# Patient Record
Sex: FEMALE | Race: ASIAN | Employment: FULL TIME | URBAN - METROPOLITAN AREA
[De-identification: names, ages, dates, MRNs, and addresses within clinical notes are randomized per-mention and may not be internally consistent; named-entity substitution may affect disease eponyms.]

---

## 2017-09-18 ENCOUNTER — TELEPHONE (OUTPATIENT)
Dept: FAMILY MEDICINE CLINIC | Age: 57
End: 2017-09-18

## 2017-09-18 NOTE — TELEPHONE ENCOUNTER
----- Message from Charlie Orta sent at 9/15/2017  4:31 PM EDT -----  Regarding: Dr. Hussain Castrejon  The patient is requesting that a copy of the facility that she had her last mammogram done or a copy of her mammogram results is prepared for  from the office preferably on Monday 9/18/17.  [phone]

## 2019-04-16 ENCOUNTER — OFFICE VISIT (OUTPATIENT)
Dept: FAMILY MEDICINE CLINIC | Age: 59
End: 2019-04-16

## 2019-04-16 VITALS
SYSTOLIC BLOOD PRESSURE: 132 MMHG | HEIGHT: 65 IN | DIASTOLIC BLOOD PRESSURE: 80 MMHG | HEART RATE: 90 BPM | TEMPERATURE: 98.2 F | RESPIRATION RATE: 18 BRPM | OXYGEN SATURATION: 98 % | WEIGHT: 157.2 LBS | BODY MASS INDEX: 26.19 KG/M2

## 2019-04-16 DIAGNOSIS — R74.8 ELEVATED LIVER ENZYMES: Primary | ICD-10-CM

## 2019-04-16 DIAGNOSIS — E55.9 VITAMIN D DEFICIENCY: ICD-10-CM

## 2019-04-16 DIAGNOSIS — Z12.11 SCREENING FOR COLON CANCER: ICD-10-CM

## 2019-04-16 DIAGNOSIS — Z85.43 HISTORY OF OVARIAN CANCER: ICD-10-CM

## 2019-04-16 DIAGNOSIS — R73.03 PRE-DIABETES: ICD-10-CM

## 2019-04-16 DIAGNOSIS — E78.5 HYPERLIPIDEMIA, UNSPECIFIED HYPERLIPIDEMIA TYPE: ICD-10-CM

## 2019-04-16 RX ORDER — CHOLECALCIFEROL (VITAMIN D3) 125 MCG
5000 CAPSULE ORAL DAILY
COMMUNITY

## 2019-04-16 RX ORDER — ASPIRIN 81 MG/1
81 TABLET ORAL DAILY
COMMUNITY

## 2019-04-16 RX ORDER — VITAMIN E 268 MG
400 CAPSULE ORAL DAILY
COMMUNITY

## 2019-04-16 RX ORDER — METFORMIN HYDROCHLORIDE 500 MG/1
500 TABLET ORAL DAILY
COMMUNITY
End: 2019-05-08 | Stop reason: SDUPTHER

## 2019-04-16 NOTE — PATIENT INSTRUCTIONS
Colon Cancer Screening: Care Instructions Your Care Instructions Colorectal cancer occurs in the colon or rectum. That's the lower part of your digestive system. It is the second-leading cause of cancer deaths in the [place]. It often starts with small growths called polyps in the colon or rectum. Polyps are usually found with screening tests. Depending on the type of test, any polyps found may be removed during the tests. Colorectal cancer usually does not cause symptoms at first. But regular tests can help find it early, before it spreads and becomes harder to treat. Experts advise routine tests for colon cancer for people starting at age 48. And they advise people with a higher risk of colon cancer to get tested sooner. Talk with your doctor about when you should start testing. Discuss which tests you need. Follow-up care is a key part of your treatment and safety. Be sure to make and go to all appointments, and call your doctor if you are having problems. It's also a good idea to know your test results and keep a list of the medicines you take. What are the main screening tests for colon cancer? · Stool tests. These include the fecal immunochemical test (FIT) and the fecal occult blood test (FOBT). These tests check stool samples for signs of cancer. If your test is positive, you will need to have a colonoscopy. · Sigmoidoscopy. This test lets your doctor look at the lining of your rectum and the lowest part of your colon. Your doctor uses a lighted tube called a sigmoidoscope. This test can't find cancers or polyps in the upper part of your colon. In some cases, polyps that are found can be removed. But if your doctor finds polyps, you will need to have a colonoscopy to check the upper part of your colon. · Colonoscopy. This test lets your doctor look at the lining of your rectum and your entire colon.  The doctor uses a thin, flexible tool called a colonoscope. It can also be used to remove polyps or get a tissue sample (biopsy). What tests do you need? The following guidelines are for people age 48 and over who are not at high risk for colorectal cancer. You may have at least one of these tests as directed by your doctor. · Fecal immunochemical test (FIT) or fecal occult blood test (FOBT) every year · Sigmoidoscopy every 5 years · Colonoscopy every 10 years If you are age 68 to 80, you can work with your doctor to decide if screening is a good option. If you are age 80 or older, your doctor will likely advise that screening is not helpful. Talk with your doctor about when you need to be tested. And discuss which tests are right for you. Your doctor may recommend earlier or more frequent testing if you: 
· Have had colorectal cancer before. · Have had colon polyps. · Have symptoms of colorectal cancer. These include blood in your stool and changes in your bowel habits. · Have a parent, brother or sister, or child with colon polyps or colorectal cancer. · Have a bowel disease. This includes ulcerative colitis and Crohn's disease. · Have a rare polyp syndrome that runs in families, such as familial adenomatous polyposis (FAP). · Have had radiation treatments to the belly or pelvis. When should you call for help? Watch closely for changes in your health, and be sure to contact your doctor if: 
  · You have any changes in your bowel habits.  
  · You have any problems. Where can you learn more? Go to http://slim-nato.info/. Enter M541 in the search box to learn more about \"Colon Cancer Screening: Care Instructions. \" Current as of: March 27, 2018 Content Version: 11.9 © 9782-0823 [employer]. Care instructions adapted under license by [employer] (which disclaims liability or warranty for this information).  If you have questions about a medical condition or this instruction, always ask your healthcare professional. [place] [address] any warranty or liability for your use of this information.

## 2019-04-16 NOTE — PROGRESS NOTES
Chief Complaint Patient presents with  New Patient  Labs  
  follow up on labs abnormal liver enzimes 1. Have you been to the ER, urgent care clinic since your last visit? Hospitalized since your last visit? No 
 
2. Have you seen or consulted any other health care providers outside of the [address] [place] [address] since your last visit? Include any pap smears or colon screening.  No

## 2019-04-16 NOTE — PROGRESS NOTES
Patient Name: Yanelis Eduardo MRN: [id number] SUBJECTIVE Yanelis Eduardo is a 63 y.o. female who presents with the following: Here to establish care with new PCP. Patient states that she is being followed by hepatology for elevated liver enzymes, thought to be due to fatty liver. She recently was put on atorvastatin 20 mg for hyperlipidemia and metformin 500 mg daily for prediabetes. She has a history of vitamin D deficiency for months. Overdue for colon cancer screening. Thought that she had some swelling in the front of her neck sometime last year and an ultrasound which was reportedly normal.  Has had some difficulty losing weight but admits that she has not been exercising regularly. Had mammogram done at [facility]. Sister has breast cancer. Review of Systems Constitutional: Negative for fever, malaise/fatigue and weight loss. Respiratory: Negative for cough, hemoptysis, shortness of breath and wheezing. Cardiovascular: Negative for chest pain, palpitations, leg swelling and PND. Gastrointestinal: Negative for abdominal pain, constipation, diarrhea, nausea and vomiting. The patient's medications, allergies, past medical history, surgical history, family history and social history were reviewed and updated where appropriate. Prior to Admission medications Medication Sig Start Date End Date Taking? Authorizing Provider  
aspirin delayed-release 81 mg tablet Take 81 mg by mouth daily. Yes Provider, Historical  
metFORMIN (GLUCOPHAGE) 500 mg tablet Take 500 mg by mouth daily. Yes Provider, Historical  
cholecalciferol, vitamin D3, (VITAMIN D3) 2,000 unit tab Take 5,000 Units by mouth daily. Yes Provider, Historical  
vitamin E (AQUA GEMS) 400 unit capsule Take 400 Units by mouth daily. Yes Provider, Historical  
MILK THISTLE PO Take 525 mg by mouth daily. Yes Provider, Historical  
atorvastatin (LIPITOR) 10 mg tablet Take 1 tablet by mouth nightly. Patient taking differently: Take 20 mg by mouth nightly. 1/11/15  Yes Avel Madden MD  
co-enzyme Q-10 (CO Q-10) 100 mg capsule Take 100 mg by mouth two (2) times a day. Yes Provider, Historical  
ALPRAZolam (XANAX) 0.5 mg tablet Take 1 Tab by mouth three (3) times daily as needed for Anxiety (related to flying). 14  Yes Avel Madden MD  
multivitamin (ONE A DAY) tablet Take 1 Tab by mouth daily. Yes Provider, Historical  
 
 
Allergies Allergen Reactions  Crestor [Rosuvastatin] Other (comments) Aching  Zocor [Simvastatin] Other (comments) Felt tired and achy Past Medical History:  
Diagnosis Date  Anemia NEC  Anxiety about flying 2012  Beta thalassemia trait 3/11/2011  Elevated liver enzymes 3/11/2011  
 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  
Procedure Laterality Date [address] [address]  
 x 2  
 HX OOPHORECTOMY  [id number] left ovary for cysts  HX TOTAL ABDOMINAL HYSTERECTOMY    
 with right oopherectomy for ovarian mass Family History Problem Relation Age of Onset Lang.Moras Arthritis-osteo Mother  High Cholesterol Mother  Heart Failure Father  High Cholesterol Father  Heart Attack Father 64  
      MI age 64  High Cholesterol Sister  Heart Disease Sister   
     cabg at age 46  
 Heart Failure Paternal Grandfather Social History Socioeconomic History  Marital status: SINGLE  
 Spouse name: Not on file  Number of children: 2  
 Years of education: Not on file  Highest education level: Not on file Occupational History  Occupation: Part-time  at [employer] Social Needs  Financial resource strain: Not on file  Food insecurity:  
  Worry: Not on file Inability: Not on file  Transportation needs:  
  Medical: Not on file Non-medical: Not on file Tobacco Use  Smoking status: Never Smoker  Smokeless tobacco: Never Used Substance and Sexual Activity  Alcohol use: No  
 Drug use: No  
 Sexual activity: Not Currently Partners: Male Lifestyle  Physical activity:  
  Days per week: Not on file Minutes per session: Not on file  Stress: Not on file Relationships  Social connections:  
  Talks on phone: Not on file Gets together: Not on file Attends Rastafarian service: Not on file Active member of club or organization: Not on file Attends meetings of clubs or organizations: Not on file Relationship status: Not on file  Intimate partner violence:  
  Fear of current or ex partner: Not on file Emotionally abused: Not on file Physically abused: Not on file Forced sexual activity: Not on file Other Topics Concern [address] Service Not Asked  Blood Transfusions Not Asked  Caffeine Concern No  
  Comment: 1 cup of coffee a day; no sodas  Occupational Exposure Not Asked [place] Not Asked  Sleep Concern Not Asked  Stress Concern Not Asked  Weight Concern No  
  Comment: stable over the years, usually in the 140-150 range  Special Diet Yes Comment: tries to follow sensible diet, avoids sweets and sugars, paying attention to carbs more and limiting them  Back Care Yes Comment: chiropractor  Exercise No  
  Comment: plans to restart soon, not lately but had been doing 3 x a week: walks 3.5 miles  Bike Helmet Not Asked  Seat Belt Not Asked  Self-Exams Not Asked Social History Narrative , 2 sons; [place] descent; Born in [place] OBJECTIVE Visit Vitals /80 (BP 1 Location: Right arm, BP Patient Position: Sitting) Pulse 90 Temp 98.2 °F (36.8 °C) (Oral) Resp 18 Ht 5' 5\" (1.651 m) Wt 157 lb 3.2 oz (71.3 kg) SpO2 98% BMI 26.16 kg/m² Physical Exam  
Constitutional: She is oriented to person, place, and time and well-developed, well-nourished, and in no distress. No distress. HENT:  
Head: Normocephalic. Right Ear: External ear normal.  
Left Ear: External ear normal.  
Eyes: Pupils are equal, round, and reactive to light. Conjunctivae and EOM are normal.  
Neck: Normal range of motion. Neck supple. No thyromegaly present. Cardiovascular: Normal rate, regular rhythm, normal heart sounds and intact distal pulses. Exam reveals no gallop and no friction rub. No murmur heard. Pulmonary/Chest: Effort normal and breath sounds normal. No respiratory distress. She has no wheezes. She has no rales. Abdominal: Soft. Bowel sounds are normal. She exhibits no distension. There is no tenderness. There is no rebound and no guarding. Neurological: She is alert and oriented to person, place, and time. Skin: Skin is warm and dry. She is not diaphoretic. Psychiatric: Memory, affect and judgment normal.  
 
 
ASSESSMENT AND PLAN Chalo Light is a 63 y.o. female who presents today for: 1. Elevated liver enzymes Stable, continue current treatment pending review of labs. - CBC WITH AUTOMATED DIFF 
- FERRITIN 
- IRON PROFILE 
- HEPATITIS PANEL, ACUTE 2. Pre-diabetes Stable, continue current treatment pending review of labs. - HEMOGLOBIN A1C WITH EAG 
- MICROALBUMIN, UR, RAND W/ MICROALB/CREAT RATIO 3. Hyperlipidemia, unspecified hyperlipidemia type. Will calculate ASCVD risk score pending labs. - LIPID PANEL 4. Vitamin D deficiency 
- VITAMIN D, 25 HYDROXY 5. Screening for colon cancer 
- REFERRAL TO GASTROENTEROLOGY 6. BMI 26.0-26.9,adult I have reviewed/discussed the above normal BMI with the patient. I have recommended the following interventions: dietary management education, guidance, and counseling, encourage exercise, monitor weight and prescribed dietary intake.  
- TSH AND FREE T4 
 
7. History of ovarian cancer 
- CANCER ANTIGEN 125 Medications Discontinued During This Encounter Medication Reason  conjugated estrogens (PREMARIN) 0.3 mg tablet  ergocalciferol (ERGOCALCIFEROL) 50,000 unit capsule  OTHER Follow-up and Dispositions · Return in about 3 months (around 7/16/2019) for DM follow up. Medication risks/benefits/costs/interactions/alternatives discussed with patient. Advised patient to call back or return to office if symptoms worsen/change/persist. If patient cannot reach us or should anything more severe/urgent arise he/she should proceed directly to the nearest emergency department. Discussed expected course/resolution/complications of diagnosis in detail with patient. Patient given a written after visit summary which includes his/her diagnoses, current medications and vitals. Patient expressed understanding with the diagnosis and plan. Malik Hale M.D.

## 2019-05-08 ENCOUNTER — TELEPHONE (OUTPATIENT)
Dept: FAMILY MEDICINE CLINIC | Age: 59
End: 2019-05-08

## 2019-05-08 LAB
25(OH)D3+25(OH)D2 SERPL-MCNC: 40.5 NG/ML (ref 30–100)
ALBUMIN/CREAT UR: <2.3 MG/G CREAT (ref 0–30)
BASOPHILS # BLD AUTO: 0 X10E3/UL (ref 0–0.2)
BASOPHILS NFR BLD AUTO: 0 %
CANCER AG125 SERPL-ACNC: 6.7 U/ML (ref 0–38.1)
CHOLEST SERPL-MCNC: 186 MG/DL (ref 100–199)
CREAT UR-MCNC: 130.7 MG/DL
EOSINOPHIL # BLD AUTO: 0.2 X10E3/UL (ref 0–0.4)
EOSINOPHIL NFR BLD AUTO: 4 %
ERYTHROCYTE [DISTWIDTH] IN BLOOD BY AUTOMATED COUNT: 18.9 % (ref 12.3–15.4)
EST. AVERAGE GLUCOSE BLD GHB EST-MCNC: 137 MG/DL
FERRITIN SERPL-MCNC: 267 NG/ML (ref 15–150)
HAV IGM SERPL QL IA: NEGATIVE
HBA1C MFR BLD: 6.4 % (ref 4.8–5.6)
HBV CORE IGM SERPL QL IA: NEGATIVE
HBV SURFACE AG SERPL QL IA: NEGATIVE
HCT VFR BLD AUTO: 37.2 % (ref 34–46.6)
HCV AB S/CO SERPL IA: <0.1 S/CO RATIO (ref 0–0.9)
HDLC SERPL-MCNC: 49 MG/DL
HGB BLD-MCNC: 11.5 G/DL (ref 11.1–15.9)
IMM GRANULOCYTES # BLD AUTO: 0 X10E3/UL (ref 0–0.1)
IMM GRANULOCYTES NFR BLD AUTO: 0 %
INTERPRETATION, 910389: NORMAL
IRON SATN MFR SERPL: 28 % (ref 15–55)
IRON SERPL-MCNC: 70 UG/DL (ref 27–159)
LDLC SERPL CALC-MCNC: 108 MG/DL (ref 0–99)
LYMPHOCYTES # BLD AUTO: 1.9 X10E3/UL (ref 0.7–3.1)
LYMPHOCYTES NFR BLD AUTO: 35 %
MCH RBC QN AUTO: 19.2 PG (ref 26.6–33)
MCHC RBC AUTO-ENTMCNC: 30.9 G/DL (ref 31.5–35.7)
MCV RBC AUTO: 62 FL (ref 79–97)
MICROALBUMIN UR-MCNC: <3 UG/ML
MONOCYTES # BLD AUTO: 0.3 X10E3/UL (ref 0.1–0.9)
MONOCYTES NFR BLD AUTO: 5 %
NEUTROPHILS # BLD AUTO: 3 X10E3/UL (ref 1.4–7)
NEUTROPHILS NFR BLD AUTO: 56 %
PLATELET # BLD AUTO: 282 X10E3/UL (ref 150–379)
RBC # BLD AUTO: 5.99 X10E6/UL (ref 3.77–5.28)
T4 FREE SERPL-MCNC: 1.19 NG/DL (ref 0.82–1.77)
TIBC SERPL-MCNC: 252 UG/DL (ref 250–450)
TRIGL SERPL-MCNC: 146 MG/DL (ref 0–149)
TSH SERPL DL<=0.005 MIU/L-ACNC: 1.1 UIU/ML (ref 0.45–4.5)
UIBC SERPL-MCNC: 182 UG/DL (ref 131–425)
VLDLC SERPL CALC-MCNC: 29 MG/DL (ref 5–40)
WBC # BLD AUTO: 5.4 X10E3/UL (ref 3.4–10.8)

## 2019-05-08 NOTE — TELEPHONE ENCOUNTER
----- Message from [facility] sent at 5/8/2019  4:29 PM EDT -----  Regarding: Dr Asencio/ telephone  The pt returned the missed call and believes it was bout her blood work results.       Best contact number is [phone]

## 2019-05-08 NOTE — TELEPHONE ENCOUNTER
Patient request refill on metformin and cholesterol medication. Patient would also like to know if she should continue the low dose aspirin.

## 2019-05-08 NOTE — PROGRESS NOTES
Please notify patient regarding their test results: 
 
A1c is at goal; continue metformin. LDL cholesterol is a little high; continue statin. Vitamin D level normal.  
Ovarian cancer marker is normal. 
Red blood cell size is smaller with high iron levels - may be due to liver condition but will continue to monitor. Pt to schedule GI appt for colonoscopy.

## 2019-05-08 NOTE — PROGRESS NOTES
Outbound call to patient.  verified. Patient made aware of lab results and recommendations per Dr. Martin Victoria. Patient verbalized understanding.

## 2019-05-09 ENCOUNTER — PATIENT MESSAGE (OUTPATIENT)
Dept: FAMILY MEDICINE CLINIC | Age: 59
End: 2019-05-09

## 2019-05-09 DIAGNOSIS — R74.8 ELEVATED LIVER ENZYMES: Primary | ICD-10-CM

## 2019-05-09 RX ORDER — ATORVASTATIN CALCIUM 20 MG/1
20 TABLET, FILM COATED ORAL
Qty: 90 TAB | Refills: 1 | Status: SHIPPED | OUTPATIENT
Start: 2019-05-09 | End: 2019-12-05 | Stop reason: SDUPTHER

## 2019-05-09 RX ORDER — METFORMIN HYDROCHLORIDE 500 MG/1
500 TABLET ORAL DAILY
Qty: 90 TAB | Refills: 1 | Status: SHIPPED | OUTPATIENT
Start: 2019-05-09 | End: 2019-08-15 | Stop reason: SDUPTHER

## 2019-05-11 LAB
ALBUMIN SERPL-MCNC: 4.6 G/DL (ref 3.5–5.5)
ALBUMIN/GLOB SERPL: 2 {RATIO} (ref 1.2–2.2)
ALP SERPL-CCNC: 79 IU/L (ref 39–117)
ALT SERPL-CCNC: 40 IU/L (ref 0–32)
AST SERPL-CCNC: 32 IU/L (ref 0–40)
BILIRUB SERPL-MCNC: 0.5 MG/DL (ref 0–1.2)
BUN SERPL-MCNC: 10 MG/DL (ref 6–24)
BUN/CREAT SERPL: 15 (ref 9–23)
CALCIUM SERPL-MCNC: 10.2 MG/DL (ref 8.7–10.2)
CHLORIDE SERPL-SCNC: 103 MMOL/L (ref 96–106)
CO2 SERPL-SCNC: 26 MMOL/L (ref 20–29)
CREAT SERPL-MCNC: 0.65 MG/DL (ref 0.57–1)
GLOBULIN SER CALC-MCNC: 2.3 G/DL (ref 1.5–4.5)
GLUCOSE SERPL-MCNC: 149 MG/DL (ref 65–99)
POTASSIUM SERPL-SCNC: 4.4 MMOL/L (ref 3.5–5.2)
PROT SERPL-MCNC: 6.9 G/DL (ref 6–8.5)
SODIUM SERPL-SCNC: 143 MMOL/L (ref 134–144)

## 2019-05-13 NOTE — PROGRESS NOTES
Dear Ms. Mills,    I hope you are doing well. I wanted to follow up on your recent test results: Your ALT marker is slightly high and AST is normal. Overall, things remain stable. Please let me know if you have any questions.

## 2019-07-30 ENCOUNTER — OFFICE VISIT (OUTPATIENT)
Dept: FAMILY MEDICINE CLINIC | Age: 59
End: 2019-07-30

## 2019-07-30 VITALS
WEIGHT: 156.6 LBS | OXYGEN SATURATION: 97 % | RESPIRATION RATE: 16 BRPM | HEIGHT: 65 IN | TEMPERATURE: 98.3 F | BODY MASS INDEX: 26.09 KG/M2 | DIASTOLIC BLOOD PRESSURE: 72 MMHG | HEART RATE: 84 BPM | SYSTOLIC BLOOD PRESSURE: 128 MMHG

## 2019-07-30 DIAGNOSIS — E78.5 HYPERLIPIDEMIA, UNSPECIFIED HYPERLIPIDEMIA TYPE: ICD-10-CM

## 2019-07-30 DIAGNOSIS — R74.8 ELEVATED LIVER ENZYMES: ICD-10-CM

## 2019-07-30 DIAGNOSIS — R73.03 PRE-DIABETES: Primary | ICD-10-CM

## 2019-07-30 DIAGNOSIS — Z23 ENCOUNTER FOR IMMUNIZATION: ICD-10-CM

## 2019-07-30 DIAGNOSIS — D50.9 MICROCYTIC ANEMIA: ICD-10-CM

## 2019-07-30 NOTE — PATIENT INSTRUCTIONS

## 2019-07-30 NOTE — PROGRESS NOTES
Chief Complaint   Patient presents with    Diabetes     follow up     1. Have you been to the ER, urgent care clinic since your last visit? Hospitalized since your last visit? No    2. Have you seen or consulted any other health care providers outside of the [address] [place] [address] since your last visit? Include any pap smears or colon screening.  No

## 2019-07-30 NOTE — PROGRESS NOTES
Patient Name: Nicole Posadas   MRN: [id number]    Emir Cohen is a 63 y.o. female who presents with the following: The patient has hyperlipidemia and pre-dm. She reports taking medications as instructed, no medication side effects noted. Diet and Lifestyle: generally follows a low fat low cholesterol diet, generally follows a low sodium diet, no formal exercise but active during the day. Lab review: orders written for new lab studies as appropriate; see orders. Hx of microcytic anemia. Hx of elevated LFTs due to fatty liver. Review of Systems   Constitutional: Negative for fever, malaise/fatigue and weight loss. Respiratory: Negative for cough, hemoptysis, shortness of breath and wheezing. Cardiovascular: Negative for chest pain, palpitations, leg swelling and PND. Gastrointestinal: Negative for abdominal pain, constipation, diarrhea, nausea and vomiting. The patient's medications, allergies, past medical history, surgical history, family history and social history were reviewed and updated where appropriate. Prior to Admission medications    Medication Sig Start Date End Date Taking? Authorizing Provider   atorvastatin (LIPITOR) 20 mg tablet Take 1 Tab by mouth nightly. 5/9/19  Yes Mulugeta Phillips MD   metFORMIN (GLUCOPHAGE) 500 mg tablet Take 1 Tab by mouth daily. 5/9/19  Yes Mulugeta Phillips MD   cholecalciferol, vitamin D3, (VITAMIN D3) 2,000 unit tab Take 5,000 Units by mouth daily. Yes Provider, Historical   vitamin E (AQUA GEMS) 400 unit capsule Take 400 Units by mouth daily. Yes Provider, Historical   MILK THISTLE PO Take 525 mg by mouth daily. Yes Provider, Historical   multivitamin (ONE A DAY) tablet Take 1 Tab by mouth daily. Yes Provider, Historical   aspirin delayed-release 81 mg tablet Take 81 mg by mouth daily. Provider, Historical   co-enzyme Q-10 (CO Q-10) 100 mg capsule Take 100 mg by mouth two (2) times a day. Provider, Historical   ALPRAZolam (XANAX) 0.5 mg tablet Take 1 Tab by mouth three (3) times daily as needed for Anxiety (related to flying). 2/11/14   Basil Madden MD       Allergies   Allergen Reactions    Crestor [Rosuvastatin] Other (comments)     Aching     Zocor [Simvastatin] Other (comments)     Felt tired and achy           OBJECTIVE    Visit Vitals  /72 (BP 1 Location: Left arm, BP Patient Position: Sitting)   Pulse 84   Temp 98.3 °F (36.8 °C) (Oral)   Resp 16   Ht 5' 5\" (1.651 m)   Wt 156 lb 9.6 oz (71 kg)   SpO2 97%   BMI 26.06 kg/m²       Physical Exam   Constitutional: She is oriented to person, place, and time and well-developed, well-nourished, and in no distress. No distress. Eyes: Pupils are equal, round, and reactive to light. Conjunctivae and EOM are normal.   Cardiovascular: Normal rate, regular rhythm and normal heart sounds. Exam reveals no gallop and no friction rub. No murmur heard. Pulmonary/Chest: Effort normal and breath sounds normal. No respiratory distress. She has no wheezes. Neurological: She is alert and oriented to person, place, and time. Skin: Skin is warm and dry. No rash noted. She is not diaphoretic. Psychiatric: Mood, memory, affect and judgment normal.   Nursing note and vitals reviewed. ASSESSMENT AND PLAN  Judy Mccain is a 63 y.o. female who presents today for:    1. Pre-diabetes  Stable, continue current treatment pending review of labs. - HEMOGLOBIN A1C WITH EAG  - BASIC METABOLIC PANEL (7)    2. Hyperlipidemia, unspecified hyperlipidemia type  Will calculate ASCVD risk score pending labs. - LIPID PANEL    3. Elevated liver enzymes  - HEPATIC FUNCTION PANEL    4. Microcytic anemia  - CBC WITH AUTOMATED DIFF  - FERRITIN  - IRON PROFILE  - HEMOGLOBIN FRACTIONATION    5. Encounter for immunization  She will get Td at pharmacy.   - varicella-zoster recombinant, PF, (SHINGRIX, PF,) 50 mcg/0.5 mL susr injection; 0.5 mL by IntraMUSCular route once for 1 dose. Please fax vaccination documentation to [facility] at [phone], Attn: Dr. Rojas Alter: 0.5 mL; Refill: 0       There are no discontinued medications. Follow-up and Dispositions    · Return in about 6 months (around 1/30/2020) for pre-dm. Medication risks/benefits/costs/interactions/alternatives discussed with patient. Advised patient to call back or return to office if symptoms worsen/change/persist. If patient cannot reach us or should anything more severe/urgent arise he/she should proceed directly to the nearest emergency department. Discussed expected course/resolution/complications of diagnosis in detail with patient. Patient given a written after visit summary which includes his/her diagnoses, current medications and vitals. Patient expressed understanding with the diagnosis and plan. Malik Baig M.D.

## 2019-08-01 LAB
ALBUMIN SERPL-MCNC: 4.8 G/DL (ref 3.5–5.5)
ALP SERPL-CCNC: 82 IU/L (ref 39–117)
ALT SERPL-CCNC: 71 IU/L (ref 0–32)
AST SERPL-CCNC: 51 IU/L (ref 0–40)
BASOPHILS # BLD AUTO: 0 X10E3/UL (ref 0–0.2)
BASOPHILS NFR BLD AUTO: 0 %
BILIRUB DIRECT SERPL-MCNC: 0.18 MG/DL (ref 0–0.4)
BILIRUB SERPL-MCNC: 0.7 MG/DL (ref 0–1.2)
BUN SERPL-MCNC: 10 MG/DL (ref 6–24)
BUN/CREAT SERPL: 14 (ref 9–23)
CHLORIDE SERPL-SCNC: 104 MMOL/L (ref 96–106)
CHOLEST SERPL-MCNC: 194 MG/DL (ref 100–199)
CO2 SERPL-SCNC: 23 MMOL/L (ref 20–29)
CREAT SERPL-MCNC: 0.69 MG/DL (ref 0.57–1)
EOSINOPHIL # BLD AUTO: 0.2 X10E3/UL (ref 0–0.4)
EOSINOPHIL NFR BLD AUTO: 4 %
ERYTHROCYTE [DISTWIDTH] IN BLOOD BY AUTOMATED COUNT: 18.3 % (ref 12.3–15.4)
EST. AVERAGE GLUCOSE BLD GHB EST-MCNC: 163 MG/DL
FERRITIN SERPL-MCNC: 367 NG/ML (ref 15–150)
GLUCOSE SERPL-MCNC: 145 MG/DL (ref 65–99)
HBA1C MFR BLD: 7.3 % (ref 4.8–5.6)
HCT VFR BLD AUTO: 39.8 % (ref 34–46.6)
HDLC SERPL-MCNC: 50 MG/DL
HGB A MFR BLD: 93.6 % (ref 96.4–98.8)
HGB A2 MFR BLD COLUMN CHROM: 4.1 % (ref 1.8–3.2)
HGB BLD-MCNC: 12.6 G/DL (ref 11.1–15.9)
HGB C MFR BLD: 0 %
HGB F MFR BLD: 2.3 % (ref 0–2)
HGB FRACT BLD-IMP: ABNORMAL
HGB OTHER MFR BLD HPLC: 0 %
HGB S BLD QL SOLY: NEGATIVE
HGB S MFR BLD: 0 %
IMM GRANULOCYTES # BLD AUTO: 0 X10E3/UL (ref 0–0.1)
IMM GRANULOCYTES NFR BLD AUTO: 0 %
INTERPRETATION, 910389: NORMAL
IRON SATN MFR SERPL: 38 % (ref 15–55)
IRON SERPL-MCNC: 95 UG/DL (ref 27–159)
LDLC SERPL CALC-MCNC: 117 MG/DL (ref 0–99)
LYMPHOCYTES # BLD AUTO: 1.8 X10E3/UL (ref 0.7–3.1)
LYMPHOCYTES NFR BLD AUTO: 33 %
MCH RBC QN AUTO: 19.2 PG (ref 26.6–33)
MCHC RBC AUTO-ENTMCNC: 31.7 G/DL (ref 31.5–35.7)
MCV RBC AUTO: 61 FL (ref 79–97)
MONOCYTES # BLD AUTO: 0.2 X10E3/UL (ref 0.1–0.9)
MONOCYTES NFR BLD AUTO: 4 %
NEUTROPHILS # BLD AUTO: 3.2 X10E3/UL (ref 1.4–7)
NEUTROPHILS NFR BLD AUTO: 59 %
PLATELET # BLD AUTO: 289 X10E3/UL (ref 150–450)
POTASSIUM SERPL-SCNC: 4.7 MMOL/L (ref 3.5–5.2)
PROT SERPL-MCNC: 7.1 G/DL (ref 6–8.5)
RBC # BLD AUTO: 6.55 X10E6/UL (ref 3.77–5.28)
SODIUM SERPL-SCNC: 141 MMOL/L (ref 134–144)
TIBC SERPL-MCNC: 252 UG/DL (ref 250–450)
TRIGL SERPL-MCNC: 133 MG/DL (ref 0–149)
UIBC SERPL-MCNC: 157 UG/DL (ref 131–425)
VLDLC SERPL CALC-MCNC: 27 MG/DL (ref 5–40)
WBC # BLD AUTO: 5.4 X10E3/UL (ref 3.4–10.8)

## 2019-08-02 NOTE — PROGRESS NOTES
Dear Ms. Mills,    I wanted to follow up on your recent test results: Your A1c is much higher than before. Please increase your metformin dose to 2 tabs once a day (total dose of 1000 mg/day). Your liver markers a little higher than before; please relay this to your liver doctor. LDL cholesterol is a little high. Keep taking your statin and continue to work on diet/exercise. Please let me know if you have any questions.

## 2019-08-15 NOTE — TELEPHONE ENCOUNTER
Pt. called in today requesting a 90-days supply refill on the following meds. Pharm on file verified. LOV 07/30/2019  Last refill. 05/09/2019    Requested Prescriptions     Pending Prescriptions Disp Refills    metFORMIN (GLUCOPHAGE) 500 mg tablet 90 Tab 1     Sig: Take 1 Tab by mouth daily.

## 2019-08-27 RX ORDER — METFORMIN HYDROCHLORIDE 500 MG/1
500 TABLET ORAL 2 TIMES DAILY WITH MEALS
Qty: 180 TAB | Refills: 1 | Status: SHIPPED | OUTPATIENT
Start: 2019-08-27

## 2019-08-27 NOTE — TELEPHONE ENCOUNTER
Contacted patient regarding a refill request for metformin.  verified.  Patient informed nurse that she has been taking 1 tab in the AM and 1 tab in the PM.

## 2019-12-05 RX ORDER — ATORVASTATIN CALCIUM 20 MG/1
TABLET, FILM COATED ORAL
Qty: 90 TAB | Refills: 1 | Status: SHIPPED | OUTPATIENT
Start: 2019-12-05 | End: 2020-02-11 | Stop reason: SDUPTHER

## 2020-02-11 ENCOUNTER — OFFICE VISIT (OUTPATIENT)
Dept: FAMILY MEDICINE CLINIC | Age: 60
End: 2020-02-11

## 2020-02-11 VITALS
HEIGHT: 65 IN | OXYGEN SATURATION: 96 % | DIASTOLIC BLOOD PRESSURE: 68 MMHG | TEMPERATURE: 97.9 F | SYSTOLIC BLOOD PRESSURE: 112 MMHG | HEART RATE: 91 BPM | BODY MASS INDEX: 25.83 KG/M2 | WEIGHT: 155 LBS | RESPIRATION RATE: 18 BRPM

## 2020-02-11 DIAGNOSIS — Z82.49 FAMILY HISTORY OF EARLY CAD: ICD-10-CM

## 2020-02-11 DIAGNOSIS — R74.8 ELEVATED LIVER ENZYMES: ICD-10-CM

## 2020-02-11 DIAGNOSIS — R73.03 PRE-DIABETES: ICD-10-CM

## 2020-02-11 DIAGNOSIS — E78.5 HYPERLIPIDEMIA, UNSPECIFIED HYPERLIPIDEMIA TYPE: Primary | ICD-10-CM

## 2020-02-11 DIAGNOSIS — Z12.11 SCREENING FOR COLON CANCER: ICD-10-CM

## 2020-02-11 DIAGNOSIS — R00.2 PALPITATIONS: ICD-10-CM

## 2020-02-11 RX ORDER — ATORVASTATIN CALCIUM 20 MG/1
TABLET, FILM COATED ORAL
Qty: 90 TAB | Refills: 1 | Status: SHIPPED | OUTPATIENT
Start: 2020-02-11

## 2020-02-11 NOTE — PROGRESS NOTES
Marco Antonio Olguin is a 63 y.o. female    Room 13     Chief Complaint   Patient presents with    Follow-up    Cholesterol Problem     1. Have you been to the ER, urgent care clinic since your last visit? Hospitalized since your last visit? No      2. Have you seen or consulted any other health care providers outside of the [address] [place] [address] since your last visit? Include any pap smears or colon screening.  No

## 2020-02-11 NOTE — PATIENT INSTRUCTIONS
Palpitations: Care Instructions Your Care Instructions Heart palpitations are the uncomfortable sensation that your heart is beating fast or irregularly. You might feel pounding or fluttering in your chest. It might feel like your heart is skipping a beat. Although palpitations may be caused by a heart problem, they also occur because of stress, fatigue, or use of alcohol, caffeine, or nicotine. Many medicines, including diet pills, antihistamines, decongestants, and some herbal products, can cause heart palpitations. Nearly everyone has palpitations from time to time. Depending on your symptoms, your doctor may need to do more tests to try to find the cause of your palpitations. Follow-up care is a key part of your treatment and safety. Be sure to make and go to all appointments, and call your doctor if you are having problems. It's also a good idea to know your test results and keep a list of the medicines you take. How can you care for yourself at home? · Avoid caffeine, nicotine, and excess alcohol. · Do not take illegal drugs, such as methamphetamines and cocaine. · Do not take weight loss or diet medicines unless you talk with your doctor first. 
· Get plenty of sleep. · Do not overeat. · If you have palpitations again, take deep breaths and try to relax. This may slow a racing heart. · If you start to feel lightheaded, lie down to avoid injuries that might result if you pass out and fall down. · Keep a record of your palpitations and bring it to your next doctor's appointment. Write down: ? The date and time. ? Your pulse. (If your heart is beating fast, it may be hard to count your pulse.) ? What you were doing when the palpitations started. ? How long the palpitations lasted. ? Any other symptoms. · If an activity causes palpitations, slow down or stop. Talk to your doctor before you do that activity again. · Take your medicines exactly as prescribed.  Call your doctor if you think you are having a problem with your medicine. When should you call for help? Call 911 anytime you think you may need emergency care. For example, call if: 
  · You passed out (lost consciousness).  
  · You have symptoms of a heart attack. These may include: 
? Chest pain or pressure, or a strange feeling in the chest. 
? Sweating. ? Shortness of breath. ? Pain, pressure, or a strange feeling in the back, neck, jaw, or upper belly or in one or both shoulders or arms. ? Lightheadedness or sudden weakness. ? A fast or irregular heartbeat. After you call 911, the  may tell you to chew 1 adult-strength or 2 to 4 low-dose aspirin. Wait for an ambulance. Do not try to drive yourself.  
  · You have symptoms of a stroke. These may include: 
? Sudden numbness, tingling, weakness, or loss of movement in your face, arm, or leg, especially on only one side of your body. ? Sudden vision changes. ? Sudden trouble speaking. ? Sudden confusion or trouble understanding simple statements. ? Sudden problems with walking or balance. ? A sudden, severe headache that is different from past headaches.  
 Call your doctor now or seek immediate medical care if: 
  · You have heart palpitations and: ? Are dizzy or lightheaded, or you feel like you may faint. ? Have new or increased shortness of breath.  
 Watch closely for changes in your health, and be sure to contact your doctor if: 
  · You continue to have heart palpitations. Where can you learn more? Go to http://slim-nato.info/. Enter R508 in the search box to learn more about \"Palpitations: Care Instructions. \" Current as of: April 9, 2019 Content Version: 12.2 © 0104-4837 [employer]. Care instructions adapted under license by [employer] (which disclaims liability or warranty for this information).  If you have questions about a medical condition or this instruction, always ask your healthcare professional. [place] [address] any warranty or liability for your use of this information.

## 2020-02-11 NOTE — PROGRESS NOTES
Patient Name: Christ Collins   MRN: [id number]    Keyur Aj is a 63 y.o. female who presents with the following: The patient has hyperlipidemia, pre dm, and elevated LFTs. She reports no medication side effects noted, only taking metformin once a day. Diet and Lifestyle: generally follows a low fat low cholesterol diet, generally follows a low sodium diet, no formal exercise but active during the day. Lab review: orders written for new lab studies as appropriate; see orders. Had two episodes of palpitations up to the 130s in the past month. Usually resolves if lying down. Unsure if related to anxiety. She would like to see a cardiology given father hx of CAD in his 62s and 4 cousins with heart bypass surgery. Had not done a mammogram in 2019 but will schedule at [facility]. Does not have a reliable person to take her for a colonoscopy. Review of Systems   Constitutional: Negative for fever, malaise/fatigue and weight loss. Respiratory: Negative for cough, hemoptysis, shortness of breath and wheezing. Cardiovascular: Positive for palpitations. Negative for chest pain, leg swelling and PND. Gastrointestinal: Negative for abdominal pain, constipation, diarrhea, nausea and vomiting. The patient's medications, allergies, past medical history, surgical history, family history and social history were reviewed and updated where appropriate. Prior to Admission medications    Medication Sig Start Date End Date Taking? Authorizing Provider   atorvastatin (LIPITOR) 20 mg tablet TAKE ONE TABLET BY MOUTH ONE TIME DAILY AT NIGHT 12/5/19  Yes Amy Sharif MD   metFORMIN (GLUCOPHAGE) 500 mg tablet Take 1 Tab by mouth two (2) times daily (with meals). 8/27/19  Yes Amy Sharif MD   aspirin delayed-release 81 mg tablet Take 81 mg by mouth daily. Yes Provider, Historical   cholecalciferol, vitamin D3, (VITAMIN D3) 2,000 unit tab Take 5,000 Units by mouth daily. Yes Provider, Historical   vitamin E (AQUA GEMS) 400 unit capsule Take 400 Units by mouth daily. Yes Provider, Historical   MILK THISTLE PO Take 525 mg by mouth daily. Yes Provider, Historical   multivitamin (ONE A DAY) tablet Take 1 Tab by mouth daily. Yes Provider, Historical   co-enzyme Q-10 (CO Q-10) 100 mg capsule Take 100 mg by mouth two (2) times a day. Provider, Historical   ALPRAZolam (XANAX) 0.5 mg tablet Take 1 Tab by mouth three (3) times daily as needed for Anxiety (related to flying). 2/11/14   Micha Madden MD       Allergies   Allergen Reactions    Crestor [Rosuvastatin] Other (comments)     Aching     Zocor [Simvastatin] Other (comments)     Felt tired and achy         OBJECTIVE    Visit Vitals  /68 (BP 1 Location: Left arm, BP Patient Position: Sitting)   Pulse 91   Temp 97.9 °F (36.6 °C) (Oral)   Resp 18   Ht 5' 5\" (1.651 m)   Wt 155 lb (70.3 kg)   SpO2 96%   BMI 25.79 kg/m²       Physical Exam  Vitals signs and nursing note reviewed. Constitutional:       General: She is not in acute distress. Appearance: She is not diaphoretic. Eyes:      Conjunctiva/sclera: Conjunctivae normal.      Pupils: Pupils are equal, round, and reactive to light. Cardiovascular:      Rate and Rhythm: Normal rate and regular rhythm. Heart sounds: Normal heart sounds. No murmur. No friction rub. No gallop. Pulmonary:      Effort: Pulmonary effort is normal. No respiratory distress. Breath sounds: Normal breath sounds. No wheezing. Skin:     General: Skin is warm and dry. Findings: No rash. Neurological:      Mental Status: She is alert and oriented to person, place, and time. Psychiatric:         Mood and Affect: Mood and affect normal.         Cognition and Memory: Memory normal.         Judgment: Judgment normal.           ASSESSMENT AND PLAN  Bobby Concepcion is a 63 y.o. female who presents today for:    1.  Hyperlipidemia, unspecified hyperlipidemia type  Will calculate ASCVD risk score pending labs. I have reviewed/discussed the above normal BMI with the patient. I have recommended the following interventions: dietary management education, guidance, and counseling, encourage exercise, monitor weight and prescribed dietary intake. - LIPID PANEL  - atorvastatin (LIPITOR) 20 mg tablet; TAKE ONE TABLET BY MOUTH ONE TIME DAILY AT NIGHT  Dispense: 90 Tab; Refill: 1    2. Elevated liver enzymes  Pt no longer sees hepatology for this. Will monitor here. - METABOLIC PANEL, COMPREHENSIVE    3. Pre-diabetes  - HEMOGLOBIN A1C WITH EAG    4. Palpitations  Per pt preference, refer to cardiology.  - REFERRAL TO CARDIOLOGY    5. Family history of early CAD  - REFERRAL TO CARDIOLOGY    6. Screening for colon cancer  - OCCULT BLOOD IMMUNOASSAY,DIAGNOSTIC       Medications Discontinued During This Encounter   Medication Reason    ALPRAZolam (XANAX) 0.5 mg tablet     co-enzyme Q-10 (CO Q-10) 100 mg capsule     atorvastatin (LIPITOR) 20 mg tablet Reorder       Follow-up and Dispositions    · Return in about 6 months (around 8/11/2020) for HLD follow up. Medication risks/benefits/costs/interactions/alternatives discussed with patient. Advised patient to call back or return to office if symptoms worsen/change/persist. If patient cannot reach us or should anything more severe/urgent arise he/she should proceed directly to the nearest emergency department. Discussed expected course/resolution/complications of diagnosis in detail with patient. Patient given a written after visit summary which includes his/her diagnoses, current medications and vitals. Patient expressed understanding with the diagnosis and plan. Malik Burch M.D.